# Patient Record
Sex: MALE | Race: AMERICAN INDIAN OR ALASKA NATIVE | NOT HISPANIC OR LATINO | Employment: UNEMPLOYED | URBAN - METROPOLITAN AREA
[De-identification: names, ages, dates, MRNs, and addresses within clinical notes are randomized per-mention and may not be internally consistent; named-entity substitution may affect disease eponyms.]

---

## 2024-01-01 ENCOUNTER — MEDICAL CORRESPONDENCE (OUTPATIENT)
Dept: HEALTH INFORMATION MANAGEMENT | Facility: CLINIC | Age: 44
End: 2024-01-01

## 2024-01-01 ENCOUNTER — APPOINTMENT (OUTPATIENT)
Dept: CT IMAGING | Facility: CLINIC | Age: 44
End: 2024-01-01
Attending: EMERGENCY MEDICINE
Payer: MEDICAID

## 2024-01-01 ENCOUNTER — HOSPITAL ENCOUNTER (INPATIENT)
Facility: CLINIC | Age: 44
LOS: 1 days | End: 2024-08-09
Attending: EMERGENCY MEDICINE | Admitting: STUDENT IN AN ORGANIZED HEALTH CARE EDUCATION/TRAINING PROGRAM
Payer: MEDICAID

## 2024-01-01 VITALS
HEART RATE: 61 BPM | RESPIRATION RATE: 16 BRPM | WEIGHT: 161.2 LBS | DIASTOLIC BLOOD PRESSURE: 58 MMHG | HEIGHT: 69 IN | OXYGEN SATURATION: 95 % | TEMPERATURE: 97.8 F | BODY MASS INDEX: 23.88 KG/M2 | SYSTOLIC BLOOD PRESSURE: 93 MMHG

## 2024-01-01 DIAGNOSIS — E87.6 HYPOKALEMIA: ICD-10-CM

## 2024-01-01 DIAGNOSIS — J69.0 ASPIRATION PNEUMONIA OF BOTH LUNGS, UNSPECIFIED ASPIRATION PNEUMONIA TYPE, UNSPECIFIED PART OF LUNG (H): ICD-10-CM

## 2024-01-01 DIAGNOSIS — R11.2 NAUSEA AND VOMITING, UNSPECIFIED VOMITING TYPE: ICD-10-CM

## 2024-01-01 DIAGNOSIS — N13.2 HYDRONEPHROSIS WITH URINARY OBSTRUCTION DUE TO URETERAL CALCULUS: Primary | ICD-10-CM

## 2024-01-01 DIAGNOSIS — K92.2 UPPER GI BLEED: ICD-10-CM

## 2024-01-01 DIAGNOSIS — F11.90 OPIOID USE DISORDER: ICD-10-CM

## 2024-01-01 DIAGNOSIS — N20.1 URETERAL STONE: ICD-10-CM

## 2024-01-01 DIAGNOSIS — I46.9 CARDIAC ARREST (H): ICD-10-CM

## 2024-01-01 DIAGNOSIS — E86.0 DEHYDRATION: ICD-10-CM

## 2024-01-01 LAB
ALBUMIN SERPL BCG-MCNC: 3.3 G/DL (ref 3.5–5.2)
ALBUMIN UR-MCNC: 30 MG/DL
ALP SERPL-CCNC: 160 U/L (ref 40–150)
ALT SERPL W P-5'-P-CCNC: 28 U/L (ref 0–70)
AMPHETAMINES UR QL SCN: ABNORMAL
ANION GAP SERPL CALCULATED.3IONS-SCNC: 12 MMOL/L (ref 7–15)
APPEARANCE UR: CLEAR
AST SERPL W P-5'-P-CCNC: 25 U/L (ref 0–45)
BARBITURATES UR QL SCN: ABNORMAL
BASE EXCESS BLDA CALC-SCNC: -24 MMOL/L (ref -3–3)
BASE EXCESS BLDA CALC-SCNC: ABNORMAL MMOL/L
BASOPHILS # BLD AUTO: 0.1 10E3/UL (ref 0–0.2)
BASOPHILS NFR BLD AUTO: 0 %
BENZODIAZ UR QL SCN: ABNORMAL
BILIRUB SERPL-MCNC: 0.5 MG/DL
BILIRUB UR QL STRIP: NEGATIVE
BLD PROD TYP BPU: NORMAL
BLOOD COMPONENT TYPE: NORMAL
BUN SERPL-MCNC: 17.5 MG/DL (ref 6–20)
BZE UR QL SCN: ABNORMAL
CA-I BLD-MCNC: 6 MG/DL (ref 4.4–5.2)
CA-I BLD-MCNC: 6.6 MG/DL (ref 4.4–5.2)
CALCIUM SERPL-MCNC: 8.8 MG/DL (ref 8.8–10.4)
CANNABINOIDS UR QL SCN: ABNORMAL
CHLORIDE SERPL-SCNC: 92 MMOL/L (ref 98–107)
CODING SYSTEM: NORMAL
COLOR UR AUTO: YELLOW
CPB POCT: NO
CREAT SERPL-MCNC: 0.58 MG/DL (ref 0.67–1.17)
EGFRCR SERPLBLD CKD-EPI 2021: >90 ML/MIN/1.73M2
EOSINOPHIL # BLD AUTO: 0.1 10E3/UL (ref 0–0.7)
EOSINOPHIL NFR BLD AUTO: 0 %
ERYTHROCYTE [DISTWIDTH] IN BLOOD BY AUTOMATED COUNT: 13.2 % (ref 10–15)
FENTANYL UR QL: ABNORMAL
FLUAV RNA SPEC QL NAA+PROBE: NEGATIVE
FLUBV RNA RESP QL NAA+PROBE: NEGATIVE
GLUCOSE BLD-MCNC: 47 MG/DL (ref 70–99)
GLUCOSE BLD-MCNC: 47 MG/DL (ref 70–99)
GLUCOSE SERPL-MCNC: 127 MG/DL (ref 70–99)
GLUCOSE UR STRIP-MCNC: 30 MG/DL
HCO3 BLDA-SCNC: 8 MMOL/L (ref 21–28)
HCO3 BLDA-SCNC: 9 MMOL/L (ref 21–28)
HCO3 SERPL-SCNC: 27 MMOL/L (ref 22–29)
HCT VFR BLD AUTO: 36.6 % (ref 40–53)
HCT VFR BLD CALC: 15 % (ref 40–53)
HGB BLD-MCNC: 13 G/DL (ref 13.3–17.7)
HGB BLD-MCNC: 5.1 G/DL (ref 13.3–17.7)
HGB BLD-MCNC: ABNORMAL G/DL
HGB UR QL STRIP: ABNORMAL
HOLD SPECIMEN: NORMAL
IMM GRANULOCYTES # BLD: 0.2 10E3/UL
IMM GRANULOCYTES NFR BLD: 1 %
ISSUE DATE AND TIME: NORMAL
KETONES UR STRIP-MCNC: NEGATIVE MG/DL
LACTATE BLD-SCNC: 26 MMOL/L (ref 0.7–2)
LEUKOCYTE ESTERASE UR QL STRIP: ABNORMAL
LIPASE SERPL-CCNC: 9 U/L (ref 13–60)
LYMPHOCYTES # BLD AUTO: 2.2 10E3/UL (ref 0.8–5.3)
LYMPHOCYTES NFR BLD AUTO: 11 %
MCH RBC QN AUTO: 29.1 PG (ref 26.5–33)
MCHC RBC AUTO-ENTMCNC: 35.5 G/DL (ref 31.5–36.5)
MCV RBC AUTO: 82 FL (ref 78–100)
MONOCYTES # BLD AUTO: 1.5 10E3/UL (ref 0–1.3)
MONOCYTES NFR BLD AUTO: 8 %
MUCOUS THREADS #/AREA URNS LPF: PRESENT /LPF
NEUTROPHILS # BLD AUTO: 15.6 10E3/UL (ref 1.6–8.3)
NEUTROPHILS NFR BLD AUTO: 80 %
NITRATE UR QL: NEGATIVE
NRBC # BLD AUTO: 0 10E3/UL
NRBC BLD AUTO-RTO: 0 /100
O2/TOTAL GAS SETTING VFR VENT: 100 %
OPIATES UR QL SCN: ABNORMAL
OXYHGB MFR BLDA: 30 % (ref 92–100)
OXYHGB MFR BLDA: ABNORMAL %
PCO2 BLDA: 54 MM HG (ref 35–45)
PCO2 BLDA: 66 MM HG (ref 35–45)
PCP QUAL URINE (ROCHE): ABNORMAL
PH BLDA: 6.74 [PH] (ref 7.35–7.45)
PH BLDA: 6.77 [PH] (ref 7.35–7.45)
PH UR STRIP: 6.5 [PH] (ref 5–7)
PLATELET # BLD AUTO: 279 10E3/UL (ref 150–450)
PO2 BLDA: 20 MM HG (ref 80–105)
PO2 BLDA: 21 MM HG (ref 80–105)
POTASSIUM BLD-SCNC: 7.4 MMOL/L (ref 3.4–5.3)
POTASSIUM BLD-SCNC: 7.7 MMOL/L (ref 3.4–5.3)
POTASSIUM SERPL-SCNC: 3.1 MMOL/L (ref 3.4–5.3)
PROT SERPL-MCNC: 7.4 G/DL (ref 6.4–8.3)
RBC # BLD AUTO: 4.46 10E6/UL (ref 4.4–5.9)
RBC URINE: 20 /HPF
RSV RNA SPEC NAA+PROBE: NEGATIVE
SAO2 % BLDA: 10 % (ref 92–100)
SAO2 % BLDA: ABNORMAL %
SARS-COV-2 RNA RESP QL NAA+PROBE: NEGATIVE
SODIUM BLD-SCNC: 138 MMOL/L (ref 135–145)
SODIUM BLD-SCNC: 140 MMOL/L (ref 135–145)
SODIUM SERPL-SCNC: 131 MMOL/L (ref 135–145)
SP GR UR STRIP: 1.03 (ref 1–1.03)
UNIT ABO/RH: NORMAL
UNIT NUMBER: NORMAL
UNIT STATUS: NORMAL
UNIT TYPE ISBT: 9500
UROBILINOGEN UR STRIP-MCNC: >12 MG/DL
WBC # BLD AUTO: 19.6 10E3/UL (ref 4–11)
WBC URINE: 9 /HPF

## 2024-01-01 PROCEDURE — 80053 COMPREHEN METABOLIC PANEL: CPT | Performed by: EMERGENCY MEDICINE

## 2024-01-01 PROCEDURE — 250N000011 HC RX IP 250 OP 636: Performed by: EMERGENCY MEDICINE

## 2024-01-01 PROCEDURE — 96361 HYDRATE IV INFUSION ADD-ON: CPT | Mod: 59 | Performed by: EMERGENCY MEDICINE

## 2024-01-01 PROCEDURE — 92950 HEART/LUNG RESUSCITATION CPR: CPT | Performed by: EMERGENCY MEDICINE

## 2024-01-01 PROCEDURE — 96365 THER/PROPH/DIAG IV INF INIT: CPT | Mod: 59 | Performed by: EMERGENCY MEDICINE

## 2024-01-01 PROCEDURE — 74177 CT ABD & PELVIS W/CONTRAST: CPT

## 2024-01-01 PROCEDURE — 999N000157 HC STATISTIC RCP TIME EA 10 MIN

## 2024-01-01 PROCEDURE — 250N000012 HC RX MED GY IP 250 OP 636 PS 637: Performed by: EMERGENCY MEDICINE

## 2024-01-01 PROCEDURE — 99291 CRITICAL CARE FIRST HOUR: CPT | Mod: 25 | Performed by: EMERGENCY MEDICINE

## 2024-01-01 PROCEDURE — 94002 VENT MGMT INPAT INIT DAY: CPT

## 2024-01-01 PROCEDURE — 87040 BLOOD CULTURE FOR BACTERIA: CPT | Performed by: EMERGENCY MEDICINE

## 2024-01-01 PROCEDURE — 87637 SARSCOV2&INF A&B&RSV AMP PRB: CPT | Performed by: EMERGENCY MEDICINE

## 2024-01-01 PROCEDURE — 81001 URINALYSIS AUTO W/SCOPE: CPT | Performed by: EMERGENCY MEDICINE

## 2024-01-01 PROCEDURE — 99292 CRITICAL CARE ADDL 30 MIN: CPT | Mod: 25 | Performed by: EMERGENCY MEDICINE

## 2024-01-01 PROCEDURE — 302A3N1 TRANSFUSION OF NONAUTOLOGOUS RED BLOOD CELLS INTO BONE MARROW, PERCUTANEOUS APPROACH: ICD-10-PCS | Performed by: EMERGENCY MEDICINE

## 2024-01-01 PROCEDURE — 87086 URINE CULTURE/COLONY COUNT: CPT | Performed by: EMERGENCY MEDICINE

## 2024-01-01 PROCEDURE — 82330 ASSAY OF CALCIUM: CPT

## 2024-01-01 PROCEDURE — 82810 BLOOD GASES O2 SAT ONLY: CPT

## 2024-01-01 PROCEDURE — 31500 INSERT EMERGENCY AIRWAY: CPT | Performed by: EMERGENCY MEDICINE

## 2024-01-01 PROCEDURE — 83690 ASSAY OF LIPASE: CPT | Performed by: EMERGENCY MEDICINE

## 2024-01-01 PROCEDURE — P9016 RBC LEUKOCYTES REDUCED: HCPCS

## 2024-01-01 PROCEDURE — 0C9S8ZZ DRAINAGE OF LARYNX, VIA NATURAL OR ARTIFICIAL OPENING ENDOSCOPIC: ICD-10-PCS | Performed by: EMERGENCY MEDICINE

## 2024-01-01 PROCEDURE — 5A1935Z RESPIRATORY VENTILATION, LESS THAN 24 CONSECUTIVE HOURS: ICD-10-PCS | Performed by: EMERGENCY MEDICINE

## 2024-01-01 PROCEDURE — 258N000003 HC RX IP 258 OP 636: Performed by: EMERGENCY MEDICINE

## 2024-01-01 PROCEDURE — 85004 AUTOMATED DIFF WBC COUNT: CPT | Performed by: EMERGENCY MEDICINE

## 2024-01-01 PROCEDURE — 82803 BLOOD GASES ANY COMBINATION: CPT

## 2024-01-01 PROCEDURE — 96375 TX/PRO/DX INJ NEW DRUG ADDON: CPT | Mod: 59 | Performed by: EMERGENCY MEDICINE

## 2024-01-01 PROCEDURE — 36415 COLL VENOUS BLD VENIPUNCTURE: CPT | Performed by: EMERGENCY MEDICINE

## 2024-01-01 PROCEDURE — 120N000002 HC R&B MED SURG/OB UMMC

## 2024-01-01 PROCEDURE — G2213 INITIAT MED ASSIST TX IN ER: HCPCS | Performed by: EMERGENCY MEDICINE

## 2024-01-01 PROCEDURE — 250N000009 HC RX 250: Performed by: EMERGENCY MEDICINE

## 2024-01-01 PROCEDURE — 80307 DRUG TEST PRSMV CHEM ANLYZR: CPT | Performed by: EMERGENCY MEDICINE

## 2024-01-01 PROCEDURE — 99285 EMERGENCY DEPT VISIT HI MDM: CPT | Performed by: EMERGENCY MEDICINE

## 2024-01-01 RX ORDER — BUPRENORPHINE AND NALOXONE 8; 2 MG/1; MG/1
2 FILM, SOLUBLE BUCCAL; SUBLINGUAL ONCE
Status: COMPLETED | OUTPATIENT
Start: 2024-01-01 | End: 2024-01-01

## 2024-01-01 RX ORDER — SODIUM CHLORIDE 9 MG/ML
INJECTION, SOLUTION INTRAVENOUS
Status: DISCONTINUED
Start: 2024-01-01 | End: 2024-01-01 | Stop reason: HOSPADM

## 2024-01-01 RX ORDER — NALOXONE HYDROCHLORIDE 0.4 MG/ML
INJECTION, SOLUTION INTRAMUSCULAR; INTRAVENOUS; SUBCUTANEOUS
Status: DISCONTINUED
Start: 2024-01-01 | End: 2024-01-01 | Stop reason: HOSPADM

## 2024-01-01 RX ORDER — AMPICILLIN AND SULBACTAM 2; 1 G/1; G/1
3 INJECTION, POWDER, FOR SOLUTION INTRAMUSCULAR; INTRAVENOUS ONCE
Status: COMPLETED | OUTPATIENT
Start: 2024-01-01 | End: 2024-01-01

## 2024-01-01 RX ORDER — HYDROMORPHONE HYDROCHLORIDE 1 MG/ML
0.5 INJECTION, SOLUTION INTRAMUSCULAR; INTRAVENOUS; SUBCUTANEOUS ONCE
Status: COMPLETED | OUTPATIENT
Start: 2024-01-01 | End: 2024-01-01

## 2024-01-01 RX ORDER — PIPERACILLIN SODIUM, TAZOBACTAM SODIUM 4; .5 G/20ML; G/20ML
4.5 INJECTION, POWDER, LYOPHILIZED, FOR SOLUTION INTRAVENOUS ONCE
Status: COMPLETED | OUTPATIENT
Start: 2024-01-01 | End: 2024-01-01

## 2024-01-01 RX ORDER — METOCLOPRAMIDE HYDROCHLORIDE 5 MG/ML
10 INJECTION INTRAMUSCULAR; INTRAVENOUS ONCE
Status: COMPLETED | OUTPATIENT
Start: 2024-01-01 | End: 2024-01-01

## 2024-01-01 RX ORDER — EPINEPHRINE 0.1 MG/ML
INJECTION INTRAVENOUS
Status: DISCONTINUED
Start: 2024-01-01 | End: 2024-01-01 | Stop reason: HOSPADM

## 2024-01-01 RX ORDER — NALOXONE HYDROCHLORIDE 1 MG/ML
INJECTION INTRAMUSCULAR; INTRAVENOUS; SUBCUTANEOUS
Status: DISCONTINUED
Start: 2024-01-01 | End: 2024-01-01 | Stop reason: HOSPADM

## 2024-01-01 RX ORDER — OCTREOTIDE ACETATE 50 UG/ML
50 INJECTION, SOLUTION INTRAVENOUS; SUBCUTANEOUS ONCE
Status: DISCONTINUED | OUTPATIENT
Start: 2024-01-01 | End: 2024-01-01 | Stop reason: HOSPADM

## 2024-01-01 RX ORDER — ONDANSETRON 2 MG/ML
4 INJECTION INTRAMUSCULAR; INTRAVENOUS ONCE
Status: COMPLETED | OUTPATIENT
Start: 2024-01-01 | End: 2024-01-01

## 2024-01-01 RX ORDER — IOPAMIDOL 755 MG/ML
100 INJECTION, SOLUTION INTRAVASCULAR ONCE
Status: COMPLETED | OUTPATIENT
Start: 2024-01-01 | End: 2024-01-01

## 2024-01-01 RX ORDER — ONDANSETRON 2 MG/ML
8 INJECTION INTRAMUSCULAR; INTRAVENOUS ONCE
Status: COMPLETED | OUTPATIENT
Start: 2024-01-01 | End: 2024-01-01

## 2024-01-01 RX ORDER — SODIUM CHLORIDE AND POTASSIUM CHLORIDE 150; 900 MG/100ML; MG/100ML
INJECTION, SOLUTION INTRAVENOUS CONTINUOUS
Status: DISCONTINUED | OUTPATIENT
Start: 2024-01-01 | End: 2024-01-01 | Stop reason: HOSPADM

## 2024-01-01 RX ORDER — KETOROLAC TROMETHAMINE 15 MG/ML
10 INJECTION, SOLUTION INTRAMUSCULAR; INTRAVENOUS ONCE
Status: COMPLETED | OUTPATIENT
Start: 2024-01-01 | End: 2024-01-01

## 2024-01-01 RX ADMIN — SODIUM CHLORIDE 40 ML: 9 INJECTION, SOLUTION INTRAVENOUS at 06:19

## 2024-01-01 RX ADMIN — BUPRENORPHINE AND NALOXONE 2 FILM: 8; 2 FILM BUCCAL; SUBLINGUAL at 08:59

## 2024-01-01 RX ADMIN — ONDANSETRON 8 MG: 2 INJECTION INTRAMUSCULAR; INTRAVENOUS at 06:30

## 2024-01-01 RX ADMIN — SODIUM CHLORIDE 1000 ML: 9 INJECTION, SOLUTION INTRAVENOUS at 06:29

## 2024-01-01 RX ADMIN — METOCLOPRAMIDE HYDROCHLORIDE 10 MG: 5 INJECTION INTRAMUSCULAR; INTRAVENOUS at 08:13

## 2024-01-01 RX ADMIN — ONDANSETRON 4 MG: 2 INJECTION INTRAMUSCULAR; INTRAVENOUS at 09:01

## 2024-01-01 RX ADMIN — KETOROLAC TROMETHAMINE 10 MG: 15 INJECTION, SOLUTION INTRAMUSCULAR; INTRAVENOUS at 08:12

## 2024-01-01 RX ADMIN — PIPERACILLIN AND TAZOBACTAM 4.5 G: 4; .5 INJECTION, POWDER, LYOPHILIZED, FOR SOLUTION INTRAVENOUS at 09:17

## 2024-01-01 RX ADMIN — HYDROMORPHONE HYDROCHLORIDE 0.5 MG: 1 INJECTION, SOLUTION INTRAMUSCULAR; INTRAVENOUS; SUBCUTANEOUS at 07:16

## 2024-01-01 RX ADMIN — HYDROMORPHONE HYDROCHLORIDE 1 MG: 1 INJECTION, SOLUTION INTRAMUSCULAR; INTRAVENOUS; SUBCUTANEOUS at 09:01

## 2024-01-01 RX ADMIN — AMPICILLIN SODIUM AND SULBACTAM SODIUM 3 G: 2; 1 INJECTION, POWDER, FOR SOLUTION INTRAMUSCULAR; INTRAVENOUS at 07:42

## 2024-01-01 RX ADMIN — BUPRENORPHINE AND NALOXONE 2 FILM: 8; 2 FILM BUCCAL; SUBLINGUAL at 08:09

## 2024-01-01 RX ADMIN — IOPAMIDOL 79 ML: 755 INJECTION, SOLUTION INTRAVENOUS at 06:19

## 2024-01-01 RX ADMIN — FAMOTIDINE 20 MG: 10 INJECTION, SOLUTION INTRAVENOUS at 06:36

## 2024-01-01 RX ADMIN — POTASSIUM CHLORIDE AND SODIUM CHLORIDE: 900; 150 INJECTION, SOLUTION INTRAVENOUS at 07:42

## 2024-01-01 ASSESSMENT — COLUMBIA-SUICIDE SEVERITY RATING SCALE - C-SSRS
1. IN THE PAST MONTH, HAVE YOU WISHED YOU WERE DEAD OR WISHED YOU COULD GO TO SLEEP AND NOT WAKE UP?: NO
2. HAVE YOU ACTUALLY HAD ANY THOUGHTS OF KILLING YOURSELF IN THE PAST MONTH?: NO
6. HAVE YOU EVER DONE ANYTHING, STARTED TO DO ANYTHING, OR PREPARED TO DO ANYTHING TO END YOUR LIFE?: NO

## 2024-01-01 ASSESSMENT — ACTIVITIES OF DAILY LIVING (ADL)
ADLS_ACUITY_SCORE: 35
ADLS_ACUITY_SCORE: 35
ADLS_ACUITY_SCORE: 33
ADLS_ACUITY_SCORE: 33
ADLS_ACUITY_SCORE: 35

## 2024-08-09 PROBLEM — E87.6 HYPOKALEMIA: Status: ACTIVE | Noted: 2024-01-01

## 2024-08-09 PROBLEM — R11.2 NAUSEA AND VOMITING, UNSPECIFIED VOMITING TYPE: Status: ACTIVE | Noted: 2024-01-01

## 2024-08-09 PROBLEM — J69.0 ASPIRATION PNEUMONIA OF BOTH LUNGS, UNSPECIFIED ASPIRATION PNEUMONIA TYPE, UNSPECIFIED PART OF LUNG (H): Status: ACTIVE | Noted: 2024-01-01

## 2024-08-09 PROBLEM — N20.1 URETERAL STONE: Status: ACTIVE | Noted: 2024-01-01

## 2024-08-09 PROBLEM — E86.0 DEHYDRATION: Status: ACTIVE | Noted: 2024-01-01

## 2024-08-09 NOTE — ED PROVIDER NOTES
7 AM -signed out pending admission to hospital medicine.  Diagnosis of aspiration pneumonia, kidney stone, potential pyelonephritis on opposite kidney from kidney stone.  Unasyn has been given    I reassessed the patient face-to-face who is still feeling some pain and nausea, likely related to the use of fentanyl in the past somewhat of withdrawal from opioid.  Will dose with Suboxone 16 mg sublingual, Reglan for nausea, Toradol for pain.            -----  Initiation of Medication for the Treatment of Opioid Use Disorder (OUD) in the Emergency Department (ED)  [facility] code      Assessment:   Opioid(s) used: fentanyl   Amount: Varies  Frequency: Daily  Route: smoked  Duration: 5 years off-and-on  Last use: 20 hours prior    Other substance(s) with ongoing use: Methamphetamines and cannabis    The patient meets the following DSM-V criteria for Opioid Use Disorder (OUD):   Taking more than was intended  Reduced social, occupational, or recreational activities  Recurrent use in physically hazardous situations  Continued use despite a problem caused or worsened by opioids    (Severity: Mild: 2-3 criteria, Moderate: 4-5 criteria. Severe: 6 or more criteria)  Based on my assessment, the patient has Moderate OUD.     Medication Initiated in the ED:  In the ED, treatment for OUD was initiated. The patient was given 1 dose(s) of 16 mg sublingual buprenorphine while in the ED.     Upon ED discharge, outpatient prescription treatment for OUD was initiated.   Patient was admitted    Referral to Ongoing Care and Supportive Services:   Upon ED discharge, the patient was referred to Addiction Medicine for ongoing care and supportive services. The patient was also provided with information on community resources for various supportive services for OUD, and advised to return to the ED if having worsening symptoms.       -----    8:30 AM -signed out to hospital medicine    900am: Discussed with patient need for second dose of 60 mg  sublingual Suboxone given patient's continued pain, nausea, body pain.  Additional dose of IV Dilaudid, nausea meds.      930am: Rounded on patient who had been noted to have worsening O2 sats and feel nauseous, patient was minimally responsive to pain, pupils dilated and slowly reactive, bradypnea, cardiac rhythm was narrow complex in the 80s.  Strong concern for acute change in breathing and mental status, so moved to room 1 with bedside nurse and emergency department team.      Given that patient was in admitted medicine patient however not seen, by hospitalist yet, rapid response, code team was called for admitted patient.  This was helpful as pharmacist and flyer nurse respond .  Patient was placed on monitor noticed to have wide-complex tachycardic rhythm, so cardiac pads placed and further IV access established.  Physical pulse was difficult to palpate, so Alessandro was placed on patient to perform chest compressions.  Amiodarone ordered verbally.      patient noted to be hypoxic.  So bag-valve-mask with 15 L O2, while intubation was being set up.  Patient began to vomit frothy blood profusely into the bag-valve-mask.  Video laryngoscopy and suction were performed, with successful visualization of a 7.5 tube into the patient's airway.  Once ET tube established, no blood in the ET tube so pulmonary hemorrhage extremely unlikely.  Root source of blood in airway upper GI bleed with differential diagnosis being extreme esophageal varices or stomach ulcer.  Pulse check continue to have no palpable pulse, so I the epinephrine given and chest compressions continued.      For this reason, emergency blood was verbally ordered, to humeral intraosseous lines established to pressure bag in the first 2 units of blood.  Peripheral IVs were used for IV fluids, and ACLS code medications as patient became completely unresponsive with no pulse.  Rounds of sodium bicarbonate, epi, calcium chloride, second dose of amiodarone given.   Blood from intraosseous lines used for i-STAT, showing profound anemia which was consistent with massive GI bleed.      Point-of-care ultrasound continue to show no cardiac activity during pulse checks, where no palpable pulse and asystole remained on repeated pulse checks between high quality CPR and ACLS, stat blood, treatment for massive GI bleed, and hypoxia    Over the course of 40 minutes, all treatable causes were addressed with 4 units of emergency blood, IV fluid bolus, ACLS medication, push dose of octreotide, chest compressions and point-of-care ultrasound.      Time of death 10:11 AM    10:45 AM -I have discussed the case with the triage hospitalist and admitting hospitalist who are ultimately going to admit the patient .  I called Father Ramon [facility]tz [phone] (this number is out of service).     I called the number in his chart [phone] which continued to ring without voicemail.  No other contacts known at this time    [facility] referral and medical examiner organized by charge nurse and emergency department    Critical Care Addendum    My initial assessment, based on my physical exam, established that Mc Anglin has  massive GI bleed , which requires immediate intervention, and therefore He is critically ill.     After the initial assessment, the care team initiated multiple lab tests, initiated IV fluid administration, initiated medication therapy with above medications, and consulted with ECMO physician who understood case and ultimately declined ECMO transfer  to provide stabilization care. Due to the critical nature of this patient, I reassessed vital signs, physical exam, review of cardiac rhythm monitor, mental status, neurologic status, and respiratory status multiple times prior to He disposition.     Time also spent performing documentation, discussion with family to obtain medical information for decision making, reviewing test results, discussion with consultants, and  "coordination of care.     Critical care time (excluding teaching time and procedures): 75 minutes.          [facility] Procedure Note          Intubation:     Date/Time: 11:48 AM  Performed by: Tristin Shabazz MD    The procedure was performed in an emergent situation.  Risks and benefits: risks, benefits and alternatives were discussed.  Patient identity confirmed: verbally with patient and arm band  Time out: Immediately prior to procedure a \"time out\" was called to verify the correct patient, procedure, equipment, support staff and site/side marked as required.    Indication: Acute respiratory failure.    Intubation method: Glidescope  Patient status: Unconscious  Preoxygenation: Mask  Pretreatment medications: None  Laryngoscope size: Mac 3  Tube size: 7.5 cuffed with cuff inflated after placement  Number of attempts: 1  Cricoid pressure: yes  Cords visualized: yes  Post-procedure assessment: Breath sounds equal bilaterally with chest rise and absent over the epigastrium and CO2 detector.  ETT to teeth: 21 cm  Tube secured with: ETT martínez    Patient tolerated the procedure well with no immediate complications.  Complications:  None         Tristin Shabazz MD  08/09/24 0830       Tristin Shabazz MD  08/09/24 1109       Tristin Shabazz MD  08/09/24 1111       Tristin Shabazz MD  08/09/24 1200    "

## 2024-08-09 NOTE — PROGRESS NOTES
Responded to [employer] call. Code team was called due to cardiac arrest. Patient was on mechanical ventilator, RR 16/min, SpO2 100%. Respiratory interventions included suctioning, monitoring, intubation. Patient .    Tom West RRT-NPS  2024 10:27 AM

## 2024-08-09 NOTE — ED NOTES
Pt extreme restless - needed up to 5 staff to calm him down and stabilize him! Pt pulled out previous PIV- new one restarted. Pt out of bed to floor to bed, etc. Extra meds ordered - given.

## 2024-08-09 NOTE — PROGRESS NOTES
1020: [facility] notified.   1040: Medical examiner notified.    Per ME, pt is OK to go to the morgue, No autopsy required, and they will call lab to hold all specimens. MD, ED RN manager, ANS, and primary RN notified.     1120: [facility] notified this writer that the pt is not candidate for donation and therefore can be release to the  home. Care team updated.

## 2024-08-09 NOTE — ED TRIAGE NOTES
Abd pain that has been going on for 3 days. N/V/D for the past 3 days. Last normal BM was 5 days ago.      Triage Assessment (Adult)       Row Name 08/09/24 0313          Triage Assessment    Airway WDL WDL        Respiratory WDL    Respiratory WDL WDL        Skin Circulation/Temperature WDL    Skin Circulation/Temperature WDL WDL        Cardiac WDL    Cardiac WDL WDL        Peripheral/Neurovascular WDL    Peripheral Neurovascular WDL WDL        Cognitive/Neuro/Behavioral WDL    Cognitive/Neuro/Behavioral WDL WDL

## 2024-08-09 NOTE — ED NOTES
Around 0925, pt much calmer after receiving additional pain and antiemetic. Cool rag placed on pt's head- he thanked nurse and then roll over and said removed the rag from his head. Pt then turned to his let side towards the rail for a few seconds and the roll onto his back. Pt was lying with oxyplus in place. Within 2 to 3 mins, pt could not be woken up. Pt then moved to room 1 for intubation.

## 2024-08-09 NOTE — ED NOTES
"First Step  Program - Initial SUN Consult Note:    Demographics:  Patient name Mc Anglin   /Age 1980, 44 year old   Gender/Ethnicity male   The patient's reported race is:  or    Chief Complaint Abdominal Pain and Insomnia     Language of Care English    No     Writer was unit's assigned Substance Use Navigator (SUN) for the shift and was notified by ED Provider at  08:35 AM that Mc Anglin presented seeking Mx for addiction treatment in the context of chronic opioid use. Pt approached in ED-8 for SUN consult. Pt endorses using Fentanyl 0.25g fentanyl daily - smoked, denies injecting opioids. Most recent use identified as approximately  08:00 PM on 2024. Current withdrawal symptoms indicated to be severe and noteworthy for the following, per Pt: Nausea/vomiting, Body aches, Stomach/abdominal cramps, Diarrhea, and Restlessness. He also appears diaphoretic.    ED provider and primary RN notified of SUN consult and made aware of Pt's current condition/symptoms.    Other information:  Pt reports other substance use notable for: Methamphetamine (\"Meth\")  Most recent use also indicated to be approximately 8:00pm on 2024. Pt states that he does have a history of injecting methamphetamine.    Comfort items/interventions provided to Pt by RAMON following initial consult: Cold pack, Bathroom, and Other: Motivational Conversation    Pt does not have a history of utilizing medication-assisted treatment (MAT) for their opioid use.    Pt wants to pursue treatment options following ED stay: yes   Pt presents from, or with plan to attend, treatment facility: no  [facility] and will provide further info regarding outpatient clinics following ED/hospital stay. MD indicated he would like Pt admitted for medical concerns, Pt amenable to this at this time.    Pt contact for follow-up: Not given, Pt states he does not have a phone     "
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
## 2024-08-10 LAB — BACTERIA UR CULT: NORMAL

## 2024-08-14 LAB — BACTERIA BLD CULT: NO GROWTH
